# Patient Record
Sex: FEMALE | Race: OTHER | HISPANIC OR LATINO
[De-identification: names, ages, dates, MRNs, and addresses within clinical notes are randomized per-mention and may not be internally consistent; named-entity substitution may affect disease eponyms.]

---

## 2023-06-27 PROBLEM — Z00.00 ENCOUNTER FOR PREVENTIVE HEALTH EXAMINATION: Status: ACTIVE | Noted: 2023-06-27

## 2023-07-20 ENCOUNTER — APPOINTMENT (OUTPATIENT)
Dept: RHEUMATOLOGY | Facility: CLINIC | Age: 33
End: 2023-07-20
Payer: MEDICAID

## 2023-07-20 VITALS
SYSTOLIC BLOOD PRESSURE: 130 MMHG | BODY MASS INDEX: 30.23 KG/M2 | HEART RATE: 85 BPM | HEIGHT: 60 IN | OXYGEN SATURATION: 99 % | WEIGHT: 154 LBS | DIASTOLIC BLOOD PRESSURE: 70 MMHG

## 2023-07-20 DIAGNOSIS — R76.8 OTHER SPECIFIED ABNORMAL IMMUNOLOGICAL FINDINGS IN SERUM: ICD-10-CM

## 2023-07-20 DIAGNOSIS — M25.50 PAIN IN UNSPECIFIED JOINT: ICD-10-CM

## 2023-07-20 DIAGNOSIS — R53.83 OTHER FATIGUE: ICD-10-CM

## 2023-07-20 DIAGNOSIS — Z82.49 FAMILY HISTORY OF ISCHEMIC HEART DISEASE AND OTHER DISEASES OF THE CIRCULATORY SYSTEM: ICD-10-CM

## 2023-07-20 DIAGNOSIS — M75.80 OTHER SHOULDER LESIONS, UNSPECIFIED SHOULDER: ICD-10-CM

## 2023-07-20 PROCEDURE — 99204 OFFICE O/P NEW MOD 45 MIN: CPT

## 2023-07-20 RX ORDER — MELOXICAM 15 MG/1
15 TABLET ORAL
Qty: 21 | Refills: 1 | Status: ACTIVE | COMMUNITY
Start: 2023-07-20 | End: 1900-01-01

## 2023-07-20 NOTE — HISTORY OF PRESENT ILLNESS
[FreeTextEntry1] : 34yo F with no significant PMHx in the office for evaluation\par \par History:\par PCP evaluation with +VAL\par presented to the office with complains of joint pain, myalgias and fatigue\par areas involved hands, wrist, arms and knee\par recently predominantly right shoulder pain with activity\par daily symptoms, fatigue mainly\par joint pain variable\par stiffness less then 5 minutes\par \par ROS:\par no fevers, no chills\par no malar rash\par no raynauds\par no synovitis\par no dactylitis\par

## 2023-07-20 NOTE — PHYSICAL EXAM
[General Appearance - Alert] : alert [General Appearance - In No Acute Distress] : in no acute distress [Sclera] : the sclera and conjunctiva were normal [Outer Ear] : the ears and nose were normal in appearance [Neck Appearance] : the appearance of the neck was normal [Auscultation Breath Sounds / Voice Sounds] : lungs were clear to auscultation bilaterally [Heart Rate And Rhythm] : heart rate was normal and rhythm regular [Heart Sounds] : normal S1 and S2 [Nail Clubbing] : no clubbing  or cyanosis of the fingernails [Musculoskeletal - Swelling] : no joint swelling seen [Motor Tone] : muscle strength and tone were normal [FreeTextEntry1] : + emptycan, discomfort with right shoulder internal rotation. [] : no rash [No Focal Deficits] : no focal deficits [Oriented To Time, Place, And Person] : oriented to person, place, and time

## 2023-07-22 LAB
ALBUMIN SERPL ELPH-MCNC: 4.4 G/DL
ALP BLD-CCNC: 123 U/L
ALT SERPL-CCNC: 15 U/L
ANION GAP SERPL CALC-SCNC: 12 MMOL/L
AST SERPL-CCNC: 20 U/L
BILIRUB SERPL-MCNC: 0.2 MG/DL
BUN SERPL-MCNC: 9 MG/DL
C3 SERPL-MCNC: 156 MG/DL
C4 SERPL-MCNC: 22 MG/DL
CALCIUM SERPL-MCNC: 9.4 MG/DL
CCP AB SER IA-ACNC: <8 UNITS
CHLORIDE SERPL-SCNC: 107 MMOL/L
CK SERPL-CCNC: 191 U/L
CO2 SERPL-SCNC: 22 MMOL/L
CREAT SERPL-MCNC: 0.66 MG/DL
DSDNA AB SER-ACNC: <12 IU/ML
EGFR: 117 ML/MIN/1.73M2
GLUCOSE SERPL-MCNC: 82 MG/DL
POTASSIUM SERPL-SCNC: 3.6 MMOL/L
PROT SERPL-MCNC: 7.6 G/DL
RF+CCP IGG SER-IMP: NEGATIVE
RHEUMATOID FACT SER QL: <10 IU/ML
SODIUM SERPL-SCNC: 141 MMOL/L
TSH SERPL-ACNC: 0.97 UIU/ML

## 2023-07-24 LAB
ENA RNP AB SER IA-ACNC: <0.2 AL
ENA SCL70 IGG SER IA-ACNC: <0.2 AL
ENA SM AB SER IA-ACNC: <0.2 AL
ENA SS-A AB SER IA-ACNC: <0.2 AL
ENA SS-B AB SER IA-ACNC: <0.2 AL

## 2024-07-05 ENCOUNTER — APPOINTMENT (OUTPATIENT)
Dept: RHEUMATOLOGY | Facility: CLINIC | Age: 34
End: 2024-07-05
Payer: COMMERCIAL

## 2024-07-05 ENCOUNTER — RESULT REVIEW (OUTPATIENT)
Age: 34
End: 2024-07-05

## 2024-07-05 VITALS
OXYGEN SATURATION: 99 % | HEIGHT: 60 IN | BODY MASS INDEX: 30.63 KG/M2 | SYSTOLIC BLOOD PRESSURE: 110 MMHG | HEART RATE: 99 BPM | DIASTOLIC BLOOD PRESSURE: 58 MMHG | WEIGHT: 156 LBS

## 2024-07-05 DIAGNOSIS — R76.8 OTHER SPECIFIED ABNORMAL IMMUNOLOGICAL FINDINGS IN SERUM: ICD-10-CM

## 2024-07-05 DIAGNOSIS — M75.80 OTHER SHOULDER LESIONS, UNSPECIFIED SHOULDER: ICD-10-CM

## 2024-07-05 PROCEDURE — 99214 OFFICE O/P EST MOD 30 MIN: CPT

## 2024-07-05 RX ORDER — CELECOXIB 200 MG/1
200 CAPSULE ORAL
Qty: 21 | Refills: 1 | Status: ACTIVE | COMMUNITY
Start: 2024-07-05 | End: 2024-08-16